# Patient Record
Sex: MALE | Race: WHITE | NOT HISPANIC OR LATINO | Employment: OTHER | URBAN - METROPOLITAN AREA
[De-identification: names, ages, dates, MRNs, and addresses within clinical notes are randomized per-mention and may not be internally consistent; named-entity substitution may affect disease eponyms.]

---

## 2022-01-01 ENCOUNTER — HOSPITAL ENCOUNTER (INPATIENT)
Facility: HOSPITAL | Age: 87
LOS: 2 days | Discharge: NURSING, REHAB OR LONG-TERM CARE | DRG: 177 | End: 2022-12-30
Attending: STUDENT IN AN ORGANIZED HEALTH CARE EDUCATION/TRAINING PROGRAM | Admitting: FAMILY MEDICINE
Payer: COMMERCIAL

## 2022-01-01 ENCOUNTER — APPOINTMENT (OUTPATIENT)
Dept: PHYSICAL THERAPY | Facility: HOSPITAL | Age: 87
DRG: 177 | End: 2022-01-01
Attending: STUDENT IN AN ORGANIZED HEALTH CARE EDUCATION/TRAINING PROGRAM
Payer: COMMERCIAL

## 2022-01-01 ENCOUNTER — MEDICAL CORRESPONDENCE (OUTPATIENT)
Dept: HEALTH INFORMATION MANAGEMENT | Facility: CLINIC | Age: 87
End: 2022-01-01

## 2022-01-01 ENCOUNTER — APPOINTMENT (OUTPATIENT)
Dept: CT IMAGING | Facility: HOSPITAL | Age: 87
DRG: 177 | End: 2022-01-01
Attending: STUDENT IN AN ORGANIZED HEALTH CARE EDUCATION/TRAINING PROGRAM
Payer: COMMERCIAL

## 2022-01-01 VITALS
RESPIRATION RATE: 18 BRPM | OXYGEN SATURATION: 93 % | TEMPERATURE: 99.9 F | WEIGHT: 149.7 LBS | HEIGHT: 64 IN | HEART RATE: 78 BPM | SYSTOLIC BLOOD PRESSURE: 110 MMHG | BODY MASS INDEX: 25.56 KG/M2 | DIASTOLIC BLOOD PRESSURE: 60 MMHG

## 2022-01-01 DIAGNOSIS — J18.9 COMMUNITY ACQUIRED PNEUMONIA, UNSPECIFIED LATERALITY: Primary | ICD-10-CM

## 2022-01-01 DIAGNOSIS — R09.02 HYPOXIA: ICD-10-CM

## 2022-01-01 DIAGNOSIS — U07.1 COVID-19: ICD-10-CM

## 2022-01-01 LAB
ALBUMIN SERPL BCG-MCNC: 3.1 G/DL (ref 3.5–5.2)
ALBUMIN SERPL BCG-MCNC: 3.5 G/DL (ref 3.5–5.2)
ALP SERPL-CCNC: 62 U/L (ref 40–129)
ALP SERPL-CCNC: 66 U/L (ref 40–129)
ALT SERPL W P-5'-P-CCNC: 28 U/L (ref 10–50)
ALT SERPL W P-5'-P-CCNC: 30 U/L (ref 10–50)
ANION GAP SERPL CALCULATED.3IONS-SCNC: 11 MMOL/L (ref 7–15)
ANION GAP SERPL CALCULATED.3IONS-SCNC: 11 MMOL/L (ref 7–15)
ANION GAP SERPL CALCULATED.3IONS-SCNC: 13 MMOL/L (ref 7–15)
AST SERPL W P-5'-P-CCNC: 37 U/L (ref 10–50)
AST SERPL W P-5'-P-CCNC: 39 U/L (ref 10–50)
BASOPHILS # BLD AUTO: 0 10E3/UL (ref 0–0.2)
BASOPHILS # BLD AUTO: 0 10E3/UL (ref 0–0.2)
BASOPHILS NFR BLD AUTO: 0 %
BASOPHILS NFR BLD AUTO: 0 %
BILIRUB SERPL-MCNC: 0.8 MG/DL
BILIRUB SERPL-MCNC: 0.8 MG/DL
BUN SERPL-MCNC: 30.5 MG/DL (ref 8–23)
BUN SERPL-MCNC: 31.1 MG/DL (ref 8–23)
BUN SERPL-MCNC: 32.7 MG/DL (ref 8–23)
CALCIUM SERPL-MCNC: 8.6 MG/DL (ref 8.8–10.2)
CALCIUM SERPL-MCNC: 9 MG/DL (ref 8.8–10.2)
CALCIUM SERPL-MCNC: 9.1 MG/DL (ref 8.8–10.2)
CHLORIDE SERPL-SCNC: 104 MMOL/L (ref 98–107)
CHLORIDE SERPL-SCNC: 107 MMOL/L (ref 98–107)
CHLORIDE SERPL-SCNC: 108 MMOL/L (ref 98–107)
CREAT SERPL-MCNC: 0.93 MG/DL (ref 0.67–1.17)
CREAT SERPL-MCNC: 1.01 MG/DL (ref 0.67–1.17)
CREAT SERPL-MCNC: 1.09 MG/DL (ref 0.67–1.17)
CRP SERPL-MCNC: 105.1 MG/L
D DIMER PPP FEU-MCNC: 1.87 UG/ML FEU (ref 0–0.5)
DEPRECATED HCO3 PLAS-SCNC: 23 MMOL/L (ref 22–29)
DEPRECATED HCO3 PLAS-SCNC: 28 MMOL/L (ref 22–29)
DEPRECATED HCO3 PLAS-SCNC: 29 MMOL/L (ref 22–29)
EOSINOPHIL # BLD AUTO: 0 10E3/UL (ref 0–0.7)
EOSINOPHIL # BLD AUTO: 0 10E3/UL (ref 0–0.7)
EOSINOPHIL NFR BLD AUTO: 0 %
EOSINOPHIL NFR BLD AUTO: 0 %
ERYTHROCYTE [DISTWIDTH] IN BLOOD BY AUTOMATED COUNT: 12.8 % (ref 10–15)
ERYTHROCYTE [DISTWIDTH] IN BLOOD BY AUTOMATED COUNT: 13 % (ref 10–15)
ERYTHROCYTE [DISTWIDTH] IN BLOOD BY AUTOMATED COUNT: 13 % (ref 10–15)
FERRITIN SERPL-MCNC: 730 NG/ML (ref 31–409)
FLUAV RNA SPEC QL NAA+PROBE: NEGATIVE
FLUBV RNA RESP QL NAA+PROBE: NEGATIVE
GFR SERPL CREATININE-BSD FRML MDRD: 66 ML/MIN/1.73M2
GFR SERPL CREATININE-BSD FRML MDRD: 72 ML/MIN/1.73M2
GFR SERPL CREATININE-BSD FRML MDRD: 80 ML/MIN/1.73M2
GLUCOSE SERPL-MCNC: 112 MG/DL (ref 70–99)
GLUCOSE SERPL-MCNC: 122 MG/DL (ref 70–99)
GLUCOSE SERPL-MCNC: 125 MG/DL (ref 70–99)
HCT VFR BLD AUTO: 47.3 % (ref 40–53)
HCT VFR BLD AUTO: 48.1 % (ref 40–53)
HCT VFR BLD AUTO: 50.6 % (ref 40–53)
HGB BLD-MCNC: 15.1 G/DL (ref 13.3–17.7)
HGB BLD-MCNC: 15.1 G/DL (ref 13.3–17.7)
HGB BLD-MCNC: 15.8 G/DL (ref 13.3–17.7)
HOLD SPECIMEN: NORMAL
HOLD SPECIMEN: NORMAL
IMM GRANULOCYTES # BLD: 0 10E3/UL
IMM GRANULOCYTES # BLD: 0.1 10E3/UL
IMM GRANULOCYTES NFR BLD: 1 %
IMM GRANULOCYTES NFR BLD: 1 %
LYMPHOCYTES # BLD AUTO: 0.7 10E3/UL (ref 0.8–5.3)
LYMPHOCYTES # BLD AUTO: 0.7 10E3/UL (ref 0.8–5.3)
LYMPHOCYTES NFR BLD AUTO: 11 %
LYMPHOCYTES NFR BLD AUTO: 12 %
MAGNESIUM SERPL-MCNC: 2.4 MG/DL (ref 1.7–2.3)
MCH RBC QN AUTO: 29.8 PG (ref 26.5–33)
MCH RBC QN AUTO: 30 PG (ref 26.5–33)
MCH RBC QN AUTO: 30.2 PG (ref 26.5–33)
MCHC RBC AUTO-ENTMCNC: 31.2 G/DL (ref 31.5–36.5)
MCHC RBC AUTO-ENTMCNC: 31.4 G/DL (ref 31.5–36.5)
MCHC RBC AUTO-ENTMCNC: 31.9 G/DL (ref 31.5–36.5)
MCV RBC AUTO: 94 FL (ref 78–100)
MCV RBC AUTO: 96 FL (ref 78–100)
MCV RBC AUTO: 97 FL (ref 78–100)
MONOCYTES # BLD AUTO: 0.4 10E3/UL (ref 0–1.3)
MONOCYTES # BLD AUTO: 0.5 10E3/UL (ref 0–1.3)
MONOCYTES NFR BLD AUTO: 7 %
MONOCYTES NFR BLD AUTO: 8 %
NEUTROPHILS # BLD AUTO: 4.7 10E3/UL (ref 1.6–8.3)
NEUTROPHILS # BLD AUTO: 5 10E3/UL (ref 1.6–8.3)
NEUTROPHILS NFR BLD AUTO: 80 %
NEUTROPHILS NFR BLD AUTO: 80 %
NRBC # BLD AUTO: 0 10E3/UL
NRBC # BLD AUTO: 0 10E3/UL
NRBC BLD AUTO-RTO: 0 /100
NRBC BLD AUTO-RTO: 0 /100
NT-PROBNP SERPL-MCNC: 257 PG/ML (ref 0–1800)
PHOSPHATE SERPL-MCNC: 2.9 MG/DL (ref 2.5–4.5)
PLATELET # BLD AUTO: 161 10E3/UL (ref 150–450)
PLATELET # BLD AUTO: 163 10E3/UL (ref 150–450)
PLATELET # BLD AUTO: 168 10E3/UL (ref 150–450)
POTASSIUM SERPL-SCNC: 3.6 MMOL/L (ref 3.4–5.3)
POTASSIUM SERPL-SCNC: 3.6 MMOL/L (ref 3.4–5.3)
POTASSIUM SERPL-SCNC: 3.9 MMOL/L (ref 3.4–5.3)
PROCALCITONIN SERPL IA-MCNC: 1.29 NG/ML
PROT SERPL-MCNC: 6.2 G/DL (ref 6.4–8.3)
PROT SERPL-MCNC: 6.5 G/DL (ref 6.4–8.3)
RBC # BLD AUTO: 5.03 10E6/UL (ref 4.4–5.9)
RBC # BLD AUTO: 5.06 10E6/UL (ref 4.4–5.9)
RBC # BLD AUTO: 5.23 10E6/UL (ref 4.4–5.9)
RSV RNA SPEC NAA+PROBE: NEGATIVE
SARS-COV-2 RNA RESP QL NAA+PROBE: POSITIVE
SODIUM SERPL-SCNC: 143 MMOL/L (ref 136–145)
SODIUM SERPL-SCNC: 144 MMOL/L (ref 136–145)
SODIUM SERPL-SCNC: 147 MMOL/L (ref 136–145)
TROPONIN T SERPL HS-MCNC: 17 NG/L
WBC # BLD AUTO: 5.8 10E3/UL (ref 4–11)
WBC # BLD AUTO: 6.1 10E3/UL (ref 4–11)
WBC # BLD AUTO: 6.2 10E3/UL (ref 4–11)

## 2022-01-01 PROCEDURE — 250N000013 HC RX MED GY IP 250 OP 250 PS 637: Performed by: STUDENT IN AN ORGANIZED HEALTH CARE EDUCATION/TRAINING PROGRAM

## 2022-01-01 PROCEDURE — 97116 GAIT TRAINING THERAPY: CPT | Mod: GP

## 2022-01-01 PROCEDURE — 250N000011 HC RX IP 250 OP 636: Performed by: STUDENT IN AN ORGANIZED HEALTH CARE EDUCATION/TRAINING PROGRAM

## 2022-01-01 PROCEDURE — 99222 1ST HOSP IP/OBS MODERATE 55: CPT | Mod: AI

## 2022-01-01 PROCEDURE — 250N000011 HC RX IP 250 OP 636

## 2022-01-01 PROCEDURE — G1010 CDSM STANSON: HCPCS

## 2022-01-01 PROCEDURE — 82728 ASSAY OF FERRITIN: CPT

## 2022-01-01 PROCEDURE — 93005 ELECTROCARDIOGRAM TRACING: CPT | Performed by: STUDENT IN AN ORGANIZED HEALTH CARE EDUCATION/TRAINING PROGRAM

## 2022-01-01 PROCEDURE — 87040 BLOOD CULTURE FOR BACTERIA: CPT | Performed by: STUDENT IN AN ORGANIZED HEALTH CARE EDUCATION/TRAINING PROGRAM

## 2022-01-01 PROCEDURE — 85379 FIBRIN DEGRADATION QUANT: CPT | Performed by: STUDENT IN AN ORGANIZED HEALTH CARE EDUCATION/TRAINING PROGRAM

## 2022-01-01 PROCEDURE — 258N000003 HC RX IP 258 OP 636: Performed by: STUDENT IN AN ORGANIZED HEALTH CARE EDUCATION/TRAINING PROGRAM

## 2022-01-01 PROCEDURE — 36415 COLL VENOUS BLD VENIPUNCTURE: CPT | Performed by: STUDENT IN AN ORGANIZED HEALTH CARE EDUCATION/TRAINING PROGRAM

## 2022-01-01 PROCEDURE — 99285 EMERGENCY DEPT VISIT HI MDM: CPT | Mod: 25

## 2022-01-01 PROCEDURE — 85025 COMPLETE CBC W/AUTO DIFF WBC: CPT | Performed by: STUDENT IN AN ORGANIZED HEALTH CARE EDUCATION/TRAINING PROGRAM

## 2022-01-01 PROCEDURE — 250N000013 HC RX MED GY IP 250 OP 250 PS 637

## 2022-01-01 PROCEDURE — C9803 HOPD COVID-19 SPEC COLLECT: HCPCS

## 2022-01-01 PROCEDURE — 80048 BASIC METABOLIC PNL TOTAL CA: CPT | Performed by: STUDENT IN AN ORGANIZED HEALTH CARE EDUCATION/TRAINING PROGRAM

## 2022-01-01 PROCEDURE — 86140 C-REACTIVE PROTEIN: CPT

## 2022-01-01 PROCEDURE — 84484 ASSAY OF TROPONIN QUANT: CPT | Performed by: STUDENT IN AN ORGANIZED HEALTH CARE EDUCATION/TRAINING PROGRAM

## 2022-01-01 PROCEDURE — 80053 COMPREHEN METABOLIC PANEL: CPT | Performed by: STUDENT IN AN ORGANIZED HEALTH CARE EDUCATION/TRAINING PROGRAM

## 2022-01-01 PROCEDURE — 120N000001 HC R&B MED SURG/OB

## 2022-01-01 PROCEDURE — 84100 ASSAY OF PHOSPHORUS: CPT | Performed by: STUDENT IN AN ORGANIZED HEALTH CARE EDUCATION/TRAINING PROGRAM

## 2022-01-01 PROCEDURE — 84145 PROCALCITONIN (PCT): CPT | Performed by: STUDENT IN AN ORGANIZED HEALTH CARE EDUCATION/TRAINING PROGRAM

## 2022-01-01 PROCEDURE — 99238 HOSP IP/OBS DSCHRG MGMT 30/<: CPT | Mod: GC | Performed by: STUDENT IN AN ORGANIZED HEALTH CARE EDUCATION/TRAINING PROGRAM

## 2022-01-01 PROCEDURE — 36415 COLL VENOUS BLD VENIPUNCTURE: CPT

## 2022-01-01 PROCEDURE — 97162 PT EVAL MOD COMPLEX 30 MIN: CPT | Mod: GP

## 2022-01-01 PROCEDURE — 85025 COMPLETE CBC W/AUTO DIFF WBC: CPT

## 2022-01-01 PROCEDURE — 87637 SARSCOV2&INF A&B&RSV AMP PRB: CPT | Performed by: STUDENT IN AN ORGANIZED HEALTH CARE EDUCATION/TRAINING PROGRAM

## 2022-01-01 PROCEDURE — 85018 HEMOGLOBIN: CPT | Performed by: STUDENT IN AN ORGANIZED HEALTH CARE EDUCATION/TRAINING PROGRAM

## 2022-01-01 PROCEDURE — 80053 COMPREHEN METABOLIC PANEL: CPT

## 2022-01-01 PROCEDURE — 83735 ASSAY OF MAGNESIUM: CPT | Performed by: STUDENT IN AN ORGANIZED HEALTH CARE EDUCATION/TRAINING PROGRAM

## 2022-01-01 PROCEDURE — 83880 ASSAY OF NATRIURETIC PEPTIDE: CPT | Performed by: STUDENT IN AN ORGANIZED HEALTH CARE EDUCATION/TRAINING PROGRAM

## 2022-01-01 RX ORDER — ACETAMINOPHEN 325 MG/1
650 TABLET ORAL EVERY 6 HOURS PRN
Status: DISCONTINUED | OUTPATIENT
Start: 2022-01-01 | End: 2022-01-01 | Stop reason: HOSPADM

## 2022-01-01 RX ORDER — ATORVASTATIN CALCIUM 40 MG/1
40 TABLET, FILM COATED ORAL AT BEDTIME
COMMUNITY
Start: 2022-01-01

## 2022-01-01 RX ORDER — CEFTRIAXONE 1 G/1
1 INJECTION, POWDER, FOR SOLUTION INTRAMUSCULAR; INTRAVENOUS EVERY 24 HOURS
Status: DISCONTINUED | OUTPATIENT
Start: 2022-01-01 | End: 2022-01-01 | Stop reason: HOSPADM

## 2022-01-01 RX ORDER — ASPIRIN 81 MG/1
81 TABLET ORAL DAILY
Status: DISCONTINUED | OUTPATIENT
Start: 2022-01-01 | End: 2022-01-01 | Stop reason: HOSPADM

## 2022-01-01 RX ORDER — AZITHROMYCIN 250 MG/1
250 TABLET, FILM COATED ORAL DAILY
Status: DISCONTINUED | OUTPATIENT
Start: 2022-01-01 | End: 2022-01-01 | Stop reason: HOSPADM

## 2022-01-01 RX ORDER — ALLOPURINOL 300 MG/1
300 TABLET ORAL DAILY
Status: DISCONTINUED | OUTPATIENT
Start: 2022-01-01 | End: 2022-01-01 | Stop reason: HOSPADM

## 2022-01-01 RX ORDER — CLOPIDOGREL BISULFATE 75 MG/1
75 TABLET ORAL DAILY
COMMUNITY
Start: 2022-01-01

## 2022-01-01 RX ORDER — ENOXAPARIN SODIUM 100 MG/ML
40 INJECTION SUBCUTANEOUS EVERY 24 HOURS
Status: DISCONTINUED | OUTPATIENT
Start: 2022-01-01 | End: 2022-01-01 | Stop reason: HOSPADM

## 2022-01-01 RX ORDER — IOPAMIDOL 755 MG/ML
100 INJECTION, SOLUTION INTRAVASCULAR ONCE
Status: COMPLETED | OUTPATIENT
Start: 2022-01-01 | End: 2022-01-01

## 2022-01-01 RX ORDER — LISINOPRIL 5 MG/1
5 TABLET ORAL DAILY
Status: DISCONTINUED | OUTPATIENT
Start: 2022-01-01 | End: 2022-01-01 | Stop reason: HOSPADM

## 2022-01-01 RX ORDER — MULTIPLE VITAMINS W/ MINERALS TAB 9MG-400MCG
1 TAB ORAL DAILY
COMMUNITY

## 2022-01-01 RX ORDER — ATORVASTATIN CALCIUM 40 MG/1
40 TABLET, FILM COATED ORAL AT BEDTIME
Status: DISCONTINUED | OUTPATIENT
Start: 2022-01-01 | End: 2022-01-01 | Stop reason: HOSPADM

## 2022-01-01 RX ORDER — POLYETHYLENE GLYCOL 3350 17 G/17G
17 POWDER, FOR SOLUTION ORAL DAILY
Status: DISCONTINUED | OUTPATIENT
Start: 2022-01-01 | End: 2022-01-01 | Stop reason: HOSPADM

## 2022-01-01 RX ORDER — PROCHLORPERAZINE MALEATE 5 MG
5 TABLET ORAL EVERY 6 HOURS PRN
Status: DISCONTINUED | OUTPATIENT
Start: 2022-01-01 | End: 2022-01-01 | Stop reason: HOSPADM

## 2022-01-01 RX ORDER — AZITHROMYCIN 250 MG/1
250 TABLET, FILM COATED ORAL DAILY
Qty: 4 TABLET | Refills: 0 | Status: SHIPPED | OUTPATIENT
Start: 2022-01-01

## 2022-01-01 RX ORDER — LISINOPRIL 5 MG/1
5 TABLET ORAL DAILY
Status: DISCONTINUED | OUTPATIENT
Start: 2022-01-01 | End: 2022-01-01

## 2022-01-01 RX ORDER — LIDOCAINE 40 MG/G
CREAM TOPICAL
Status: DISCONTINUED | OUTPATIENT
Start: 2022-01-01 | End: 2022-01-01 | Stop reason: HOSPADM

## 2022-01-01 RX ORDER — MULTIVIT-MIN/IRON/FOLIC ACID/K 18-600-40
1000 CAPSULE ORAL DAILY
COMMUNITY

## 2022-01-01 RX ORDER — HYDROXYZINE HYDROCHLORIDE 25 MG/1
50 TABLET, FILM COATED ORAL EVERY 6 HOURS PRN
Status: DISCONTINUED | OUTPATIENT
Start: 2022-01-01 | End: 2022-01-01

## 2022-01-01 RX ORDER — PROCHLORPERAZINE 25 MG
12.5 SUPPOSITORY, RECTAL RECTAL EVERY 12 HOURS PRN
Status: DISCONTINUED | OUTPATIENT
Start: 2022-01-01 | End: 2022-01-01 | Stop reason: HOSPADM

## 2022-01-01 RX ORDER — ASPIRIN 81 MG/1
81 TABLET ORAL DAILY
COMMUNITY

## 2022-01-01 RX ORDER — HYDROXYZINE HYDROCHLORIDE 25 MG/1
25 TABLET, FILM COATED ORAL EVERY 6 HOURS PRN
Status: DISCONTINUED | OUTPATIENT
Start: 2022-01-01 | End: 2022-01-01

## 2022-01-01 RX ORDER — LISINOPRIL 5 MG/1
5 TABLET ORAL DAILY
COMMUNITY
Start: 2022-01-01

## 2022-01-01 RX ORDER — AZITHROMYCIN 250 MG/1
500 TABLET, FILM COATED ORAL ONCE
Status: COMPLETED | OUTPATIENT
Start: 2022-01-01 | End: 2022-01-01

## 2022-01-01 RX ORDER — OLANZAPINE 5 MG/1
5 TABLET, ORALLY DISINTEGRATING ORAL
Status: DISCONTINUED | OUTPATIENT
Start: 2022-01-01 | End: 2022-01-01 | Stop reason: HOSPADM

## 2022-01-01 RX ORDER — ENOXAPARIN SODIUM 100 MG/ML
40 INJECTION SUBCUTANEOUS EVERY 24 HOURS
Status: DISCONTINUED | OUTPATIENT
Start: 2022-01-01 | End: 2022-01-01

## 2022-01-01 RX ORDER — ALLOPURINOL 300 MG/1
300 TABLET ORAL DAILY
COMMUNITY
Start: 2022-01-01

## 2022-01-01 RX ADMIN — SODIUM CHLORIDE, POTASSIUM CHLORIDE, SODIUM LACTATE AND CALCIUM CHLORIDE 250 ML: 600; 310; 30; 20 INJECTION, SOLUTION INTRAVENOUS at 17:03

## 2022-01-01 RX ADMIN — HYDROXYZINE HYDROCHLORIDE 25 MG: 25 TABLET, FILM COATED ORAL at 23:46

## 2022-01-01 RX ADMIN — LISINOPRIL 5 MG: 5 TABLET ORAL at 08:11

## 2022-01-01 RX ADMIN — CEFTRIAXONE SODIUM 1 G: 1 INJECTION, POWDER, FOR SOLUTION INTRAMUSCULAR; INTRAVENOUS at 13:52

## 2022-01-01 RX ADMIN — AZITHROMYCIN MONOHYDRATE 500 MG: 250 TABLET ORAL at 13:54

## 2022-01-01 RX ADMIN — HYDROXYZINE HYDROCHLORIDE 50 MG: 25 TABLET, FILM COATED ORAL at 09:25

## 2022-01-01 RX ADMIN — IOPAMIDOL 100 ML: 755 INJECTION, SOLUTION INTRAVENOUS at 16:50

## 2022-01-01 RX ADMIN — HYDROXYZINE HYDROCHLORIDE 25 MG: 25 TABLET, FILM COATED ORAL at 19:56

## 2022-01-01 RX ADMIN — HYDROXYZINE HYDROCHLORIDE 25 MG: 25 TABLET, FILM COATED ORAL at 02:42

## 2022-01-01 RX ADMIN — ATORVASTATIN CALCIUM 40 MG: 40 TABLET, FILM COATED ORAL at 21:53

## 2022-01-01 RX ADMIN — LISINOPRIL 5 MG: 5 TABLET ORAL at 09:27

## 2022-01-01 RX ADMIN — ALLOPURINOL 300 MG: 300 TABLET ORAL at 08:11

## 2022-01-01 RX ADMIN — Medication 1 MG: at 02:41

## 2022-01-01 RX ADMIN — ACETAMINOPHEN 650 MG: 325 TABLET ORAL at 08:11

## 2022-01-01 RX ADMIN — ENOXAPARIN SODIUM 40 MG: 40 INJECTION SUBCUTANEOUS at 02:41

## 2022-01-01 RX ADMIN — ACETAMINOPHEN 650 MG: 325 TABLET ORAL at 05:34

## 2022-01-01 RX ADMIN — AZITHROMYCIN MONOHYDRATE 250 MG: 250 TABLET ORAL at 09:26

## 2022-01-01 RX ADMIN — ACETAMINOPHEN 650 MG: 325 TABLET ORAL at 15:50

## 2022-01-01 RX ADMIN — LISINOPRIL 5 MG: 5 TABLET ORAL at 02:42

## 2022-01-01 RX ADMIN — ALLOPURINOL 300 MG: 300 TABLET ORAL at 09:26

## 2022-01-01 RX ADMIN — POLYETHYLENE GLYCOL 3350 17 G: 17 POWDER, FOR SOLUTION ORAL at 13:55

## 2022-01-01 RX ADMIN — SODIUM CHLORIDE 500 ML: 9 INJECTION, SOLUTION INTRAVENOUS at 13:54

## 2022-01-01 RX ADMIN — Medication 81 MG: at 09:25

## 2022-01-01 RX ADMIN — ATORVASTATIN CALCIUM 40 MG: 40 TABLET, FILM COATED ORAL at 02:51

## 2022-01-01 RX ADMIN — ACETAMINOPHEN 650 MG: 325 TABLET ORAL at 02:42

## 2022-01-01 RX ADMIN — ENOXAPARIN SODIUM 40 MG: 40 INJECTION SUBCUTANEOUS at 09:28

## 2022-01-01 ASSESSMENT — ACTIVITIES OF DAILY LIVING (ADL)
ADLS_ACUITY_SCORE: 35
ADLS_ACUITY_SCORE: 35
ADLS_ACUITY_SCORE: 37
ADLS_ACUITY_SCORE: 37
ADLS_ACUITY_SCORE: 39
ADLS_ACUITY_SCORE: 37
ADLS_ACUITY_SCORE: 35
ADLS_ACUITY_SCORE: 37
ADLS_ACUITY_SCORE: 35
ADLS_ACUITY_SCORE: 37
ADLS_ACUITY_SCORE: 37
ADLS_ACUITY_SCORE: 39
ADLS_ACUITY_SCORE: 35
ADLS_ACUITY_SCORE: 37
DEPENDENT_IADLS:: CLEANING;COOKING;LAUNDRY;SHOPPING;MEAL PREPARATION;MEDICATION MANAGEMENT;TRANSPORTATION
ADLS_ACUITY_SCORE: 39
ADLS_ACUITY_SCORE: 35
ADLS_ACUITY_SCORE: 39
ADLS_ACUITY_SCORE: 39

## 2022-12-28 PROBLEM — U07.1 COVID: Status: ACTIVE | Noted: 2022-01-01

## 2022-12-28 NOTE — ED NOTES
Bed: [place] [address]  Expected date:   Expected time:   Means of arrival: Ambulance  Comments:  WBL: Lethargic, hypoxic

## 2022-12-28 NOTE — ED TRIAGE NOTES
Presents via [facility] from [facility] for c/o AMS.  Hx of dementia.  Tested +COVID on 12/24/22.  Dtr visiting today, thought he was tired/sleepy, let him sleep.  Staff rounded on him, noted he was hard to arouse despite painful stimuli.  Initial sats 84%, FIRE started pt on 4L, responded well to mid 90s.  .  Has an 18g to left arm.           Triage Assessment     Row Name 12/28/22 4985       Triage Assessment (Adult)    Airway WDL WDL       Respiratory WDL    Respiratory WDL X       Skin Circulation/Temperature WDL    Skin Circulation/Temperature WDL WDL       Cognitive/Neuro/Behavioral WDL    Cognitive/Neuro/Behavioral WDL X;orientation    Orientation disoriented to;place;time;situation       [place] Coma Scale    Best Eye Response 4-->(E4) spontaneous    Best Motor Response 6-->(M6) obeys commands    Best Verbal Response 4-->(V4) confused    Rafy Coma Scale Score 14

## 2022-12-28 NOTE — ED PROVIDER NOTES
NAME: Richard Jordan  AGE: 87 year old male  YOB: 1935  MRN: [id number]  EVALUATION DATE & TIME: 2022  2:16 PM    PCP: Verner, Ruth  ED PROVIDER: Lisa Dye MD.    Chief Complaint   Patient presents with     Altered Mental Status     Covid Concern       FINAL IMPRESSION:  1. COVID-19    2. Hypoxia        MEDICAL DECISION MAKIN:31 PM I met with the patient, obtained history, performed an initial exam, and discussed options and plan for diagnostics and treatment here in the ED.   2:59 PM I spoke with patient's daughter, Yocasta.   4:40 PM I spoke with Dr. Sparks, hospitalist, who agrees to plan of admission.     MDM: 88 y/o M with h/o dementia who presents with altered mental status. Recently found to be covid19 positive and was difficult to wake today, found to be hypoxic to 84% by medics. Here is sating well on 3 L, will wake to voice and answer some questions. Suspect symptoms are related to covid19 infection. EKG is sinus without MINDY, troponin 17, denies any chest pain, lower suspicion for ACS. D-dimer is elevated. CT chest PE scan shows no PE, slight nodular moderate patchy bilateral pulmonary infiltrates, most likely pneumonia, not typical for covid. Discussed with resident who accepted patient for admission and will defer any antibiotics or steroid treatment to them. Confirmed with patient's daughter that he is DNR/DNI.    MEDICATIONS GIVEN IN THE EMERGENCY:  Medications   lactated ringers BOLUS 250 mL (250 mLs Intravenous New Bag 22 1703)   iopamidol (ISOVUE-370) solution 100 mL (100 mLs Intravenous Given 22 1650)       NEW PRESCRIPTIONS STARTED AT TODAY'S ER VISIT:  New Prescriptions    No medications on file        =================================================================  HPI    Patient information was obtained from: Patient and patient's daughter   Use of : N/A       Richard Jordan is a 87 year old male with a past medical history of  hypertension, dementia, CAD, who presents with altered mental status.     HPI limited due to altered mental status     Patient denies any pain. Orientated to self.     Per patient's daughter, patient was found to be Covid-19 positive on 12/23 at his living facility despite being asymptomatic prior. On 12/25 he developed a productive cough, fatigue, and generalized weakness. As of 12/27, she felt patient was starting to improve as he was sitting up and eating. Today she received a call from the facility telling her the patient was being transported to ED as his oxygen saturation was 84%. Daughter confirmed patient is DNR.       REVIEW OF SYSTEMS   Review of Systems   Unable to perform ROS: Mental status change        PAST MEDICAL HISTORY:  History reviewed. No pertinent past medical history.    PAST SURGICAL HISTORY:  No past surgical history on file.    CURRENT MEDICATIONS:    No current facility-administered medications for this encounter.    Current Outpatient Medications:      allopurinol (ZYLOPRIM) 300 MG tablet, Take 300 mg by mouth daily, Disp: , Rfl:      aspirin 81 MG EC tablet, Take 81 mg by mouth daily, Disp: , Rfl:      atorvastatin (LIPITOR) 40 MG tablet, Take 40 mg by mouth At Bedtime, Disp: , Rfl:      clopidogrel (PLAVIX) 75 MG tablet, Take 75 mg by mouth daily, Disp: , Rfl:      lisinopril (ZESTRIL) 5 MG tablet, Take 5 mg by mouth daily, Disp: , Rfl:      multivitamin w/minerals (THERA-VIT-M) tablet, Take 1 tablet by mouth daily, Disp: , Rfl:      Vitamin D, Cholecalciferol, 25 MCG (1000 UT) TABS, Take 1,000 Units by mouth daily, Disp: , Rfl:     ALLERGIES:  No Known Allergies    FAMILY HISTORY:  No family history on file.    SOCIAL HISTORY:   Social History     Socioeconomic History     Marital status:      Spouse name: None     Number of children: None     Years of education: None     Highest education level: None       PHYSICAL EXAM:    Vitals: BP (!) 148/67   Pulse 52   Temp 99.7  F  (37.6  C) (Oral)   Resp 28   SpO2 100%    Constitutional: Well developed, elderly ill appearing male  HENT: Normocephalic, atraumatic, mucous membranes moist. Neck-gross ROM intact.   Eyes: Pupils mid-range, sclera white, no discharge  Respiratory: CTAB, no respiratory distress on 3 L NC, no wheezing  Cardiovascular: Normal heart rate, regular rhythm. No lower extremity edema  GI: Soft, not distended, not tender to palpation, no palpable masses  Musculoskeletal: Moving all 4 extremities intentionally and without pain. No obvious deformity.  Skin: Warm, dry, no rash.  Neurologic: Hard of hearing, wakes to voice and answers some questions, confused, CNs grossly intact, strength/sensation intact in extremities    LAB:  All pertinent labs reviewed and interpreted.  Labs Ordered and Resulted from Time of ED Arrival to Time of ED Departure   COMPREHENSIVE METABOLIC PANEL - Abnormal       Result Value    Sodium 147 (*)     Potassium 3.6      Chloride 107      Carbon Dioxide (CO2) 29      Anion Gap 11      Urea Nitrogen 30.5 (*)     Creatinine 1.09      Calcium 9.0      Glucose 125 (*)     Alkaline Phosphatase 66      AST 39      ALT 30      Protein Total 6.5      Albumin 3.5      Bilirubin Total 0.8      GFR Estimate 66     INFLUENZA A/B & SARS-COV2 PCR MULTIPLEX - Abnormal    Influenza A PCR Negative      Influenza B PCR Negative      RSV PCR Negative      SARS CoV2 PCR Positive (*)    D DIMER QUANTITATIVE - Abnormal    D-Dimer Quantitative 1.87 (*)    CBC WITH PLATELETS AND DIFFERENTIAL - Abnormal    WBC Count 5.8      RBC Count 5.06      Hemoglobin 15.1      Hematocrit 47.3      MCV 94      MCH 29.8      MCHC 31.9      RDW 13.0      Platelet Count 163      % Neutrophils 80      % Lymphocytes 12      % Monocytes 7      % Eosinophils 0      % Basophils 0      % Immature Granulocytes 1      NRBCs per 100 WBC 0      Absolute Neutrophils 4.7      Absolute Lymphocytes 0.7 (*)     Absolute Monocytes 0.4      Absolute  Eosinophils 0.0      Absolute Basophils 0.0      Absolute Immature Granulocytes 0.0      Absolute NRBCs 0.0     TROPONIN T, HIGH SENSITIVITY - Normal    Troponin T, High Sensitivity 17     NT PROBNP INPATIENT - Normal    N terminal Pro BNP Inpatient 257         RADIOLOGY:  CT Chest Pulmonary Embolism w Contrast   Final Result   IMPRESSION:   1.  Negative for pulmonary emboli.      2.  Slightly nodular moderate patchy bilateral pulmonary infiltrates most likely pneumonia. Nonspecific but Not typical of COVID pneumonia.          EKG:   Performed at: 15:33  Impression: Normal sinus rhythm. Septal infarct. Age undetermined   Rate: 63 bpm  Rhythm: Sinus  QRS Interval: 80 ms   QTc Interval: 415 ms   I have independently reviewed and interpreted the EKG(s) documented above.     PROCEDURES:   Procedures     I, Meghana Delgado, am serving as a scribe to document services personally performed by Dr. Lisa Dye based on my observation and the provider's statements to me. I, Lisa Dye MD attest that Meghana Delgado is acting in a scribe capacity, has observed my performance of the services and has documented them in accordance with my direction.    Lisa Dye M.D.  Emergency Medicine  [facility] EMERGENCY DEPARTMENT  [address] [place] [id number] [phone]  Dept: [phone]       Lisa Dye MD  12/28/22 1339

## 2022-12-28 NOTE — H&P
[facility]    History and Physical - Hospitalist Service       Date of Admission:  12/28/2022    Assessment & Plan      Richard Jordan is a 87 year old male admitted on 12/28/2022. He has a past medical history of hypertension, CAD, dementia, presented with generalized weakness, fatigue, altered mental status and oxygen requirement in the setting of COVID infection since 12/23.     Vaccination status: unvaccinated against covid.       Covid 19-infection  Hypoxia  AMS  Patient was found to be COVID-positive 12/23 after COVID outbreak in assisted living facility.  Noted to develop mild symptoms on the 12/24.  Daughter reports he started to get worse today, with poor appetite generalized weakness and sleepiness.  Was found unresponsive and hypoxic 80s this afternoon 12/27.  And was transported by EMS to the emergency department. Started on 3 LFNC and down to 1.5 L within 1-2 hours sating 92-95 with no increased wob. Labs uremarkable except for elevated . Slightly elevated D-dimer 1.8. CT PE negative, lungs : Patchy slightly nodular infiltrates in both lungs primarily in the right upper lobe posterior segment as well as both lower lobes posteriorly. Not classic for COVID pneumonia. Could be bacterial pneumonia but given patient being afebrile, normal wbc, will continue to monitor and get procal if new fever or woresening O2 requirement. Will continue to monitor vitals closely. Daughter is with him and declines covid treatment ; remdesivir or dexa and wants to discuss this with sister who is health agent for patient in the morning.      - continuous pulse ox  - continue current O2 support titrate to keep sats 90-96%  - monitor I/Os.  - Lovenox 40 mg q24h  - tylenol PRN for fever  - consider Bcx/sputum cx with new fever or worsening cxr. Given atypical pattern on CT chest. Consider procal and low threshold for abx.   - Daughter(Katrina) declines remdesivir and wants to consider and  "decide about dexamethasone and treatment in the AM waiting for daughter(health decision maker) to arrive traveling from another state.   - hold IVF overnight, lack of PMH in chart; if patient not taking fluids consider cautious hydration to avoid volume overload.  - cbc, cmp AM      Mild hypernatremia  Sodium is elevated 147, given poor intake in last few days, likely secondary to mild dehydration. Received fluid bolus and will monitor sodium.   - monitor Bmp.      Chronic conditions:   HTN: continue PTA lisinopril  Gout: Continue PTA allopurinol  Hyperlipidemia CAD; continue PTA atorvastatin; will hold aspirin overnight.          Diet: Combination Diet Regular Diet Adult Regular diet  DVT Prophylaxis: Enoxaparin (Lovenox) SQ  Marsh Catheter: Not present  Fluids: none  Central Lines: None  Cardiac Monitoring: None  Code Status: No CPR- Do NOT Intubate    Clinically Significant Risk Factors Present on Admission         # Hypernatremia: Highest Na = 147 mmol/L in last 2 days, will monitor as appropriate        # Drug Induced Platelet Defect: home medication list includes an antiplatelet medication   # Hypertension: home medication list includes antihypertensive(s)      # Overweight: Estimated body mass index is 25.7 kg/m  as calculated from the following:    Height as of this encounter: 1.626 m (5' 4\").    Weight as of this encounter: 67.9 kg (149 lb 11.2 oz).           Disposition Plan      Expected Discharge Date: 12/31/2022                The patient's care was discussed with the Attending Physician, Dr. Ahumada.    Saud Villegas MD  Hospitalist Service  [facility]  Securely message with the [employer] Web Console (learn more here)  Text page via [employer] Paging/Directory       ______________________________________________________________________    Chief Complaint    Fatigue/sleepiness  Cough  covid +ve    Unable to obtain a history from the patient due to age and confusion, history obtained " from daughter Cady at bedside.    History of Present Illness   Richard Jordan is a 87 year old male who admitted on 12/28.  He has been a past medical history of hypertension, CAD, dementia.     He lives in assisted living was tested 12/23 and found COVID-positive. intially asymptomatic, but on evening 24th daughter was visiting and noticed that he had a changed voice, was coughing, and seemed fatigued. She was visiting daily and assiting with eating and medications, had poor appetite no fever noted.   This morning daughter came he was sleeping and didn't wake up when she gently shaked him. She let him sleep and got a call that her dad was uresponsive in the afternoon. EMS arrived patient was AMS, hypoxic sats 80s and was put on oxygen and arrived here was on 3L sating mid 90. No fever.     DNI/DNR per daughter and POLST document.       Review of Systems    Review of systems not obtained due to patient factors - age, confusion and mental status    Past Medical History    I have reviewed this patient's medical history and updated it with pertinent information if needed.   History reviewed. No pertinent past medical history.     Past Surgical History   I have reviewed this patient's surgical history and updated it with pertinent information if needed.  No past surgical history on file.     Social History   I have reviewed this patient's social history and updated it with pertinent information if needed. Richard Jordan      Family History     Unable to obtain due to: patient dementia and hard of hearning.     Prior to Admission Medications   Prior to Admission Medications   Prescriptions Last Dose Informant Patient Reported? Taking?   Vitamin D, Cholecalciferol, 25 MCG (1000 UT) TABS 12/27/2022  Yes Yes   Sig: Take 1,000 Units by mouth daily   allopurinol (ZYLOPRIM) 300 MG tablet 12/27/2022  Yes Yes   Sig: Take 300 mg by mouth daily   aspirin 81 MG EC tablet 12/27/2022  Yes Yes   Sig: Take 81 mg by mouth daily    atorvastatin (LIPITOR) 40 MG tablet 12/27/2022  Yes Yes   Sig: Take 40 mg by mouth At Bedtime   clopidogrel (PLAVIX) 75 MG tablet 12/27/2022  Yes Yes   Sig: Take 75 mg by mouth daily   lisinopril (ZESTRIL) 5 MG tablet 12/27/2022  Yes Yes   Sig: Take 5 mg by mouth daily   multivitamin w/minerals (THERA-VIT-M) tablet 12/27/2022  Yes Yes   Sig: Take 1 tablet by mouth daily      Facility-Administered Medications: None     Allergies   No Known Allergies    Physical Exam   Vital Signs: Temp: 99.7  F (37.6  C) Temp src: Oral BP: (!) 148/67 Pulse: 91   Resp: (!) 33 SpO2: 95 % O2 Device: Nasal cannula Oxygen Delivery: 3 LPM  Weight: 149 lbs 11.2 oz    Constitutional: awake, alert, no apparent distress, and appears stated age   HEENT: Atraumatic, normocephalic, extra ocular muscles intact, sclera clear, conjunctiva normal  Hematologic / Lymphatic: no cervical lymphadenopathy  Respiratory:sternotomy scar good air exchange, clear to auscultation bilaterally, no crackles or wheezing  Cardiovascular:regular rate and rhythm, normal S1 and S2, and no murmur noted  GI:  normal bowel sounds, soft, non-distended, non-tender, no masses palpated, no hepatosplenomegally  Skin: no bruising or bleeding and normal skin color, texture, turgor  Neurologic: Awake, alert, confused,hard of hearing difficulty understanding simple questions.       -Data   Data reviewed today: I reviewed all medications, new labs and imaging results over the last 24 hours. I personally reviewed the chest CT image(s) showing : Patchy slightly nodular infiltrates in both lungs primarily in the right upper lobe posterior segment as well as both lower lobes posteriorly. Not classic for COVID pneumonia.    Recent Labs   Lab 12/28/22  1517   WBC 5.8   HGB 15.1   MCV 94      *   POTASSIUM 3.6   CHLORIDE 107   CO2 29   BUN 30.5*   CR 1.09   ANIONGAP 11   ORA 9.0   *   ALBUMIN 3.5   PROTTOTAL 6.5   BILITOTAL 0.8   ALKPHOS 66   ALT 30   AST 39     Most  Recent 3 CBC's:Recent Labs   Lab Test 12/28/22  1517   WBC 5.8   HGB 15.1   MCV 94        Most Recent 3 BMP's:Recent Labs   Lab Test 12/28/22  1517   *   POTASSIUM 3.6   CHLORIDE 107   CO2 29   BUN 30.5*   CR 1.09   ANIONGAP 11   ORA 9.0   *     Most Recent 2 LFT's:Recent Labs   Lab Test 12/28/22  1517   AST 39   ALT 30   ALKPHOS 66   BILITOTAL 0.8     Most Recent 3 Creatinines:Recent Labs   Lab Test 12/28/22  1517   CR 1.09     Most Recent 3 Hemoglobins:Recent Labs   Lab Test 12/28/22  1517   HGB 15.1     5.8    \    15.1    /    163   N 80    L N/A    147 (H)    107    30.5 (H) /   ------------------------------------ 125 (H)   ALT 30   AST 39   AP 66   ALB 3.5   Ca 9.0  3.6    29    1.09 \    % RETIC N/A    LDH N/A  Troponin N/A    BNP N/A    CK N/A  INR N/A   PTT N/A    D-dimer 1.87 (H)    Fibrinogen N/A    Antithrombin N/A  Ferritin N/A  .10 (H)    IL-6 N/A  Recent Results (from the past 24 hour(s))   CT Chest Pulmonary Embolism w Contrast    Narrative    EXAM: CT CHEST PULMONARY EMBOLISM W CONTRAST  LOCATION: [facility]  DATE/TIME: 12/28/2022 4:50 PM    INDICATION: covid, hypoxia, elevated d dimer  COMPARISON: None.  TECHNIQUE: CT chest pulmonary angiogram during arterial phase injection of IV contrast. Multiplanar reformats and MIP reconstructions were performed. Dose reduction techniques were used.   CONTRAST: 100ml isovue 370    FINDINGS:  ANGIOGRAM CHEST: Pulmonary arteries are normal caliber and negative for pulmonary emboli. Thoracic aorta is negative for dissection. No CT evidence of right heart strain.    LUNGS AND PLEURA: Patchy slightly nodular infiltrates in both lungs primarily in the right upper lobe posterior segment as well as both lower lobes posteriorly. Not classic for COVID pneumonia    MEDIASTINUM/AXILLAE: Normal.    CORONARY ARTERY CALCIFICATION: Severe.    UPPER ABDOMEN: Normal.    MUSCULOSKELETAL: Normal.      Impression     IMPRESSION:  1.  Negative for pulmonary emboli.    2.  Slightly nodular moderate patchy bilateral pulmonary infiltrates most likely pneumonia. Nonspecific but Not typical of COVID pneumonia.

## 2022-12-28 NOTE — PHARMACY-ADMISSION MEDICATION HISTORY
Pharmacy Note - Admission Medication History     ______________________________________________________________________    Prior To Admission (PTA) med list completed and updated in EMR.       PTA Med List   Medication Sig Note Last Dose     allopurinol (ZYLOPRIM) 300 MG tablet Take 300 mg by mouth daily  12/27/2022     aspirin 81 MG EC tablet Take 81 mg by mouth daily  12/27/2022     atorvastatin (LIPITOR) 40 MG tablet Take 40 mg by mouth At Bedtime 12/28/2022: [facility]. However, family told me he takes 1/2 tablet at bedtime and [facility] did confirm prescription was recently changed from 80 mg x 1/2 tab, so dose remains the same, he just may be using up previous supply. 12/27/2022     clopidogrel (PLAVIX) 75 MG tablet Take 75 mg by mouth daily  12/27/2022     lisinopril (ZESTRIL) 5 MG tablet Take 5 mg by mouth daily  12/27/2022     multivitamin w/minerals (THERA-VIT-M) tablet Take 1 tablet by mouth daily  12/27/2022     Vitamin D, Cholecalciferol, 25 MCG (1000 UT) TABS Take 1,000 Units by mouth daily  12/27/2022       Information source(s): Family member and Patient's pharmacy  Method of interview communication: in-person with N95 mask    Summary of Changes to PTA Med List  New: all  -- no prior records in EMR    Patient was asked about OTC/herbal products specifically.  PTA med list reflects this.    Allergies were reviewed, assessed, and updated with the patient.      Patient does not use any multi-dose medications prior to admission.    The information provided in this note is only as accurate as the sources available at the time of the update(s).    Thank you for the opportunity to participate in the care of this patient.    Meghana Rodas [facility]  12/28/2022 5:27 PM

## 2022-12-29 NOTE — ED NOTES
Bed: JNED-28  Expected date: 12/28/22  Expected time:   Means of arrival:   Comments:  HOLD for Room 18

## 2022-12-29 NOTE — PROGRESS NOTES
[facility]    Progress Note - Hospitalist Service       Date of Admission:  12/28/2022    Assessment & Plan   Richard Jordan is a 87 year old male admitted on 12/28/2022. He has a past medical history of hypertension, CAD, dementia, presented with generalized weakness, fatigue, altered mental status and oxygen requirement in the setting of COVID infection since 12/23.      Vaccination status: unvaccinated against covid.     Remains hospitalized for mild hypoxia, dehydration, and bradycardia.     Acute hypoxic respiratory failure  Covid 19-infection  CAP  Patient was found to be COVID-positive 12/23 after COVID outbreak in assisted living facility.  Noted to develop mild symptoms on the 12/24.  Daughter reports he started to get worse on day of admission, with poor appetite generalized weakness and sleepiness.  Was found unresponsive and hypoxic 80s afternoon of 12/27 and was transported by EMS to the emergency department. Started on 3L NC and down to 1.5 L within 1-2 hours sating 92-95 with no increased wob. Labs uremarkable except for elevated . Slightly elevated D-dimer 1.8. CT PE negative, lungs : Patchy slightly nodular infiltrates in both lungs primarily in the right upper lobe posterior segment as well as both lower lobes posteriorly. Not classic for COVID pneumonia, per radiologist. With this, as well as procal elevation to 1.29, will add treatment for CAP. Will continue to monitor vitals closely. Daughter is with him and declines covid treatment ; remdesivir or dexamethasone.  - continuous pulse ox  - O2 by NC as needed to maintain sats > 92%  - monitor I/Os.  - Lovenox 40 mg q24h  - tylenol PRN for fever  - Start Ceftriaxone/azithromycin  - Daughter(Katrina) declines remdesivir and dexamethasone  - hold IVF overnight, lack of PMH in chart; if patient not taking fluids consider cautious hydration to avoid volume overload.  - cbc, cmp AM  - 500mL NS bolus given low  "UOP    Bradycardia  Bradycardic overnight to high 30s. Asymptomatic. Admission EKG showing NSR with normal intervals. No h/o AV block on chart review. No report of any history of syncopal events from daughter.  -Repeat EKG now  -Monitor, tele  -Consider atropine only if symptomatic with lightheadedness/feeling faint. Would discuss with cardiology first    Constipation  -Daily miralax     Chronic conditions:   HTN: continue PTA lisinopril  Gout: Continue PTA allopurinol  Hyperlipidemia, CAD; continue PTA atorvastatin; aspirin       Diet: Combination Diet Regular Diet Adult Regular diet  DVT Prophylaxis: Enoxaparin (Lovenox) SQ  Marsh Catheter: Not present  Fluids: none  Central Lines: None  Cardiac Monitoring: None  Code Status: No CPR- Do NOT Intubate       Disposition Plan     Expected Discharge Date: 12/31/2022                The patient's care was discussed with the Attending Physician, Dr. Vail.    Loyd Pemberton MD  Hospitalist Service  [facility]  Securely message with the Vocera Web Console (learn more here)  Text page via [employer] Paging/Directory         Clinically Significant Risk Factors Present on Admission         # Hypernatremia: Highest Na = 147 mmol/L in last 2 days, will monitor as appropriate      # Hypoalbuminemia: Lowest albumin = 3.1 g/dL at 12/29/2022  7:33 AM, will monitor as appropriate   # Drug Induced Platelet Defect: home medication list includes an antiplatelet medication   # Hypertension: home medication list includes antihypertensive(s)      # Overweight: Estimated body mass index is 25.7 kg/m  as calculated from the following:    Height as of this encounter: 1.626 m (5' 4\").    Weight as of this encounter: 67.9 kg (149 lb 11.2 oz).           ______________________________________________________________________    Interval History   Quiet but arouses to voice.  Quite hard of hearing.  Reports breathing is okay.  Denies abdominal pain, though daughter thinks " abdomen is a bit distended.  She is worried he might be constipated.  Had BM last night, but per her, was small and hard.  She is also wondering about his low urine output.    For his bradycardia, she states that he is typically ambulating independently in his assisted living facility.  They are on the verge of getting him into memory care over the next few months.  He has never had any syncopal episodes to her knowledge.    Data reviewed today: I reviewed all medications, new labs and imaging results over the last 24 hours. I personally reviewed 12/28 EKG showing NSR with normal intervals.    Physical Exam   Vital Signs: Temp: (!) 96.2  F (35.7  C) Temp src: Axillary BP: (!) 157/67 Pulse: (!) 40   Resp: 20 SpO2: 91 % O2 Device: Nasal cannula Oxygen Delivery: 1 LPM  Weight: 149 lbs 11.2 oz  Constitutional: sleeping but arouses to voice. NAD  Respiratory: Normal WOB on RA. LTCA in anterior lung fields  GI: mild distention but no ttp  Neurologic: [place]; fluent speech. Oriented to self only    Data   Recent Results (from the past 24 hour(s))   CT Chest Pulmonary Embolism w Contrast    Narrative    EXAM: CT CHEST PULMONARY EMBOLISM W CONTRAST  LOCATION: [facility]  DATE/TIME: 12/28/2022 4:50 PM    INDICATION: covid, hypoxia, elevated d dimer  COMPARISON: None.  TECHNIQUE: CT chest pulmonary angiogram during arterial phase injection of IV contrast. Multiplanar reformats and MIP reconstructions were performed. Dose reduction techniques were used.   CONTRAST: 100ml isovue 370    FINDINGS:  ANGIOGRAM CHEST: Pulmonary arteries are normal caliber and negative for pulmonary emboli. Thoracic aorta is negative for dissection. No CT evidence of right heart strain.    LUNGS AND PLEURA: Patchy slightly nodular infiltrates in both lungs primarily in the right upper lobe posterior segment as well as both lower lobes posteriorly. Not classic for COVID pneumonia    MEDIASTINUM/AXILLAE: Normal.    CORONARY ARTERY  CALCIFICATION: Severe.    UPPER ABDOMEN: Normal.    MUSCULOSKELETAL: Normal.      Impression    IMPRESSION:  1.  Negative for pulmonary emboli.    2.  Slightly nodular moderate patchy bilateral pulmonary infiltrates most likely pneumonia. Nonspecific but Not typical of COVID pneumonia.

## 2022-12-29 NOTE — CONSULTS
"Care Management Initial Consult    General Information  Assessment completed with: Batool Barragan daughter via phone  Type of CM/SW Visit: Initial Assessment    Primary Care Provider verified and updated as needed: Yes   Readmission within the last 30 days: no previous admission in last 30 days      Reason for Consult: discharge planning  Advance Care Planning: Advance Care Planning Reviewed: no concerns identified          Communication Assessment  Patient's communication style: spoken language (English or Bilingual)             Cognitive  Cognitive/Neuro/Behavioral: per daughter, \"has dementia at baseline\"       Living Environment:   People in home: facility resident     Current living Arrangements: assisted living  Name of Facility: [facility] (in [facility]).   Able to return to prior arrangements: other (see comments) (unknown at this time)       Family/Social Support:  Care provided by: self, other (see comments) (Assisted Living staff)  Provides care for: no one, unable/limited ability to care for self  Marital Status:   Children, Facility resident(s)/Staff          Description of Support System: Supportive, Involved    Support Assessment: Adequate family and caregiver support, Adequate social supports, Patient communicates needs well met    Current Resources:   Patient receiving home care services: No     Community Resources: Skilled Nursing Facility (\"going to start services with [facility] physician group probably this next week. Paperwork is already signed\".)  Equipment currently used at home: grab bar, toilet, grab bar, tub/shower, shower chair (\"bed lowered\")  Supplies currently used at home: Hearing Aid Batteries, Incontinence Supplies (hearing aids at home)    Employment/Financial:  Employment Status: retired     Employment/ Comments: [facility] notification # [id number].  Financial Concerns:     Referral to Financial Worker: No   " "    Lifestyle & Psychosocial Needs:  Social Determinants of Health     Tobacco Use: Not on file   Alcohol Use: Not on file   Financial Resource Strain: Not on file   Food Insecurity: Not on file   Transportation Needs: Not on file   Physical Activity: Not on file   Stress: Not on file   Social Connections: Not on file   Intimate Partner Violence: Not on file   Depression: Not on file   Housing Stability: Not on file       Functional Status:  Prior to admission patient needed assistance:   Dependent ADLs:: Ambulation-no assistive device (\"he is still independent with these tasks but we are starting to notice it is not going to be long before help is needed. For example noticing he says he showered, but didn't change clothing, etc\".)  Dependent IADLs:: Cleaning, Cooking, Laundry, Shopping, Meal Preparation, Medication Management, Transportation (\"meals and light housekeeping and laundry help from facility. Family sets up his medications\".)  Assesssment of Functional Status: Not at baseline with ADL Functioning, Not at baseline with mobility, Not at  functional baseline    Mental Health Status:          Chemical Dependency Status:                Values/Beliefs:  Spiritual, Cultural Beliefs, Mosque Practices, Values that affect care:                 Additional Information:  Richard was found to be COVID + on 12/24/22. He lives at [facility] (in [facility] He is \"going to start services with [facility] physician group probably this next week. Paperwork is already signed\".    He is getting a low level of services from the Assisted Living and my need to increase services. Currently they only help with \"light housekeeping, 2 loads of laundry a week, and 3 meals a day. He does all other ADLs on his own. Since his COVID diagnosis his daughter Katrina who is a teacher is off work and has been helping him every day. Family has always been managing his medication set up and " "now [facility] staff will take that over soon.\" Per daughter, \"I think we are heading in the direction of him needing more help with other ADLs from the facility. But we are starting with this medication help from [facility] because then at least someone looks in on him twice a day. One example of what we are noticing is, \"for example noticing he says he showered, but didn't change clothing, etc\".    He has a \"nickname at the facility called 5 mile. He still walks 2-3 miles a day. It has been hard on him not being able to be outside to walk now that it is cold and icy. He walks in the halls of the facility. He does not use a cane or walker.\"    He may need Home Care or TCU depending on hospital course.    Family to transport at discharge.    1. Katrina Reyes daughter: [phone] (she has been the main daughter caring for him this week since his COVID diagnosis so she knows the most, but call other daughters also if needed).  2. Batool Coreas daughter: [phone].    [facility] notification #: [id number].    Ely Mcdermott RN      "

## 2022-12-29 NOTE — ED NOTES
Notified Dr. Cope that Pt's heart rate dips to 39 pbm when sleeping. Per Dr. Cope, it is okay to let Pt sleep for now, will readdress in the AM.

## 2022-12-29 NOTE — PROGRESS NOTES
"Resident Loyd Pemberton called me and asked if I can verify with facility if patient can return today since ready for discharge. And if can return with COVID since facility already has a COVID outbreak and patient has been positive since 12/24/22.    I called and spoke to Batool sheldon. Katrina daughter is home sleeping. Batool states, \"I am fine with dad going home today. I think that is a good plan. I would need to get family to go  clothing for him and then I would drive him home.\" I made her aware of the plan to go home with an oral antibiotic.    I called [facility] [phone]. I spoke with Jaqui SALDANA she states, \"I have to call my director who is off today to see if we can accept Richard back today\". I asked her about the increase in services of medication management being started and she stated, \"with the COVID outbreak right now I don't know when we would be able to start that service for him\". She wants the chart faxed to her.    1238 Call received from: Selene RN \"We are very uncomfortable with him coming back today. And if returns today he will need family to stay with him at all times. No RN is available to resume care until tomorrow. And again if he doesn't come back until tomorrow he would have to be an early admission or we would not be able to accept him back until Monday onto our services. We don't have staff that can take admissions after noon anyday\".    1246 Called Batool sheldon to discuss discharge planning. She states, \"I was just talking to the MD and he has told me that we will stay another night in the hospital now.\"    1252 I spoke with Dr. Pemberton. Plan is to stay and monitor longer.  "

## 2022-12-30 NOTE — DISCHARGE INSTRUCTIONS
Stay with patient he will need assist to get up and needs to be supervised 24-7   Call with fever over 101 encourage food and liquids   Call with difficulty breathing

## 2022-12-30 NOTE — SIGNIFICANT EVENT
"Significant Event Note    Time of event: 5:26 AM December 30, 2022    Description of event:  House officer notified of fever 100.9. Pt 87 year old male admitted for COVID, CAP, AHRF. Also found to be bradycardic. Pt presently on ceftriaxone and azithromycin.     /50 (BP Location: Right arm)   Pulse 54   Temp (!) 100.9  F (38.3  C)   Resp 18   Ht 1.626 m (5' 4\")   Wt 67.9 kg (149 lb 11.2 oz)   SpO2 92%   BMI 25.70 kg/m      Plan:  Fever most likely due to COVID/PNA.  -Blood cultures, as has not been drawn yet.   -Continue present antibiotic regimen for now  -Tylenol for fevers    Discussed with: bedside nurse    Nikhil Thompson, DO    "

## 2022-12-30 NOTE — PROGRESS NOTES
Spoke with SHANA Morton at [facility]. For discharge planning - she reports that there will be no nurse at the facility over the weekend or on Monday to accept pt back.     Please keep Selene updated on discharge plans. [phone]

## 2022-12-30 NOTE — PLAN OF CARE
Problem: Plan of Care - These are the overarching goals to be used throughout the patient stay.    Goal: Optimal Comfort and Wellbeing  Outcome: Progressing     Problem: Risk for Delirium  Goal: Improved Sleep  Outcome: Not Progressing   Goal Outcome Evaluation:    Pt is retaining urine; bladder scan of 650ml; pt is confused, shows no classic signs of covid; pt did urinate to fill one brief this shift; paged house physician--awaiting return call as there are no orders for this pt re urinary retention; pts abdomen is rounded and distended: Dr Chau gave phone order to straight cath and give additional 25 mg atarax to help alleviate anxiety; straight cathed    ML

## 2022-12-30 NOTE — PLAN OF CARE
Physical Therapy Discharge Summary    Reason for therapy discharge:    Discharged to [facility]    Progress towards therapy goal(s). See goals on Care Plan in [facility] electronic health record for goal details.  Goals partially met.  Barriers to achieving goals:   discharge from facility.    Therapy recommendation(s):    Recommend continued PT at [facility]

## 2022-12-30 NOTE — DISCHARGE SUMMARY
[facility]  Discharge Summary - Medicine & Pediatrics       Date of Admission:  12/28/2022  Date of Discharge:  12/30/2022  Discharging Provider: Dr. Pemberton  Discharge Service: Hospitalist Service    Discharge Diagnoses   1. Community acquired pneumonia, unspecified laterality    2. COVID-19    3. Hypoxia        Follow-ups Needed After Discharge   Follow-up Appointments     Follow Up and recommended labs and tests      Follow up with [facility] physician.  The following labs/tests are   recommended: cbc.             Unresulted Labs Ordered in the Past 30 Days of this Admission     Date and Time Order Name Status Description    12/30/2022  5:31 AM Blood Culture Peripheral Blood In process     12/30/2022  5:31 AM Blood Culture Hand, Right In process       These results will be followed up by ordering provider, nursing home physician    Discharge Disposition   Discharged to long-term care facility  Condition at discharge: Stable    Hospital Course   Richard Jordan is a 87 year old male admitted on 12/28/2022. He has a pertinent past medical history of dementia and presented with generalized weakness, fatigue, altered mental status and oxygen requirement in the setting of COVID infection since 12/23.      Acute hypoxic respiratory failure - resolved  Covid 19-infection  CAP  Patient was found to be COVID-positive 12/23 after COVID outbreak in assisted living facility.  Noted to develop mild symptoms on the 12/24.  Daughter reports he started to get worse on day of admission, with poor appetite generalized weakness and sleepiness.  Was found unresponsive and hypoxic 80s afternoon of 12/27 and was transported by EMS to the emergency department. Here, had CT PE, which was negative for clot, patchy slightly nodular infiltrates in both lungs primarily in the right upper lobe posterior segment as well as both lower lobes posteriorly. Not classic for COVID pneumonia, per radiologist. With this, as  well as procal elevation to 1.29, initiated on treatment for CAP.   Daughter declined covid treatment - remdesivir or dexamethasone.  - Received 2 dose IV ceftriaxone and 2 doses oral azithromycin while hospitalized. Sent home with a 4 day course of azithromycin to complete CAP treatment     Bradycardia  Bradycardic overnight to high 30s while here. Asymptomatic. No h/o AV block on chart review. No history of syncopal events from daughter. EKG showing sinus bradycardia. No pauses on telemetry throughout stay.  -Monitor, no need for pacemaker if not having pauses or symptoms         Consultations This Hospital Stay   CARE MANAGEMENT / SOCIAL WORK IP CONSULT  PHYSICAL THERAPY ADULT IP CONSULT    Code Status   No CPR- Do NOT Intubate       The patient was discussed with Dr. Ken Pemberton MD  Resident Service  [address] [address]  Phone: [phone]  Fax: [phone]  ______________________________________________________________________    Physical Exam   Vital Signs: Temp: 99.9  F (37.7  C) Temp src: Oral BP: 110/60 Pulse: 78   Resp: 18 SpO2: 93 % O2 Device: None (Room air)    Weight: 149 lbs 11.2 oz  Constitutional: lying in bed, pulling at sheets, restless. Redirectable  Respiratory: Normal WOB on RA. LTCA in anterior lung fields  GI: mild distention but no ttp  Neurologic: [place]; fluent speech. Oriented to self only      Primary Care Physician   Ruth Verner    Discharge Orders      General info for SNF    Length of Stay Estimate: Long Term Care  Condition at Discharge: Stable  Level of care:board and care  Rehabilitation Potential: Fair  Admission H&P remains valid and up-to-date: Yes  Recent Chemotherapy: N/A  Use Nursing Home Standing Orders: Yes     Mantoux instructions    Give two-step Mantoux (PPD) Per Facility Policy Yes     Follow Up and recommended labs and tests    Follow up with [facility] physician.  The following labs/tests  are recommended: cbc.     Reason for your hospital stay    COVID infection and bacterial pneumonia     Activity - Up with nursing assistance     Airborne Isolation     Droplet Isolation     Fall precautions     Diet    Follow this diet upon discharge: Orders Placed This Encounter      Combination Diet Regular Diet Adult       Significant Results and Procedures   Results for orders placed or performed during the hospital encounter of 12/28/22   CT Chest Pulmonary Embolism w Contrast    Narrative    EXAM: CT CHEST PULMONARY EMBOLISM W CONTRAST  LOCATION: [facility]  DATE/TIME: 12/28/2022 4:50 PM    INDICATION: covid, hypoxia, elevated d dimer  COMPARISON: None.  TECHNIQUE: CT chest pulmonary angiogram during arterial phase injection of IV contrast. Multiplanar reformats and MIP reconstructions were performed. Dose reduction techniques were used.   CONTRAST: 100ml isovue 370    FINDINGS:  ANGIOGRAM CHEST: Pulmonary arteries are normal caliber and negative for pulmonary emboli. Thoracic aorta is negative for dissection. No CT evidence of right heart strain.    LUNGS AND PLEURA: Patchy slightly nodular infiltrates in both lungs primarily in the right upper lobe posterior segment as well as both lower lobes posteriorly. Not classic for COVID pneumonia    MEDIASTINUM/AXILLAE: Normal.    CORONARY ARTERY CALCIFICATION: Severe.    UPPER ABDOMEN: Normal.    MUSCULOSKELETAL: Normal.      Impression    IMPRESSION:  1.  Negative for pulmonary emboli.    2.  Slightly nodular moderate patchy bilateral pulmonary infiltrates most likely pneumonia. Nonspecific but Not typical of COVID pneumonia.       Discharge Medications   Discharge Medication List as of 12/30/2022  1:39 PM      START taking these medications    Details   azithromycin (ZITHROMAX) 250 MG tablet Take 1 tablet (250 mg) by mouth daily, Disp-4 tablet, R-0, E-Prescribe         CONTINUE these medications which have NOT CHANGED    Details    allopurinol (ZYLOPRIM) 300 MG tablet Take 300 mg by mouth daily, Historical      aspirin 81 MG EC tablet Take 81 mg by mouth daily, Historical      atorvastatin (LIPITOR) 40 MG tablet Take 40 mg by mouth At Bedtime, Historical      clopidogrel (PLAVIX) 75 MG tablet Take 75 mg by mouth daily, Historical      lisinopril (ZESTRIL) 5 MG tablet Take 5 mg by mouth daily, Historical      multivitamin w/minerals (THERA-VIT-M) tablet Take 1 tablet by mouth daily, Historical      Vitamin D, Cholecalciferol, 25 MCG (1000 UT) TABS Take 1,000 Units by mouth daily, Historical           Allergies   No Known Allergies

## 2022-12-30 NOTE — PROGRESS NOTES
"Care Management Follow Up    Length of Stay (days): 2    Expected Discharge Date: 12/31/2022     Concerns to be Addressed:  discharge planning, care progression     Patient plan of care discussed at interdisciplinary rounds: Yes    Anticipated Discharge Disposition:  Return to Assisted Living Facility ([facility])     Anticipated Discharge Services: resumption of services from [facility]  Anticipated Discharge DME:  None    Patient/family educated on Medicare website which has current facility and service quality ratings:    Education Provided on the Discharge Plan:  Yes, daughterBatool  Patient/Family in Agreement with the Plan:  Yes    Referrals Placed by CM/SW:    Private pay costs discussed: Not applicable    Additional Information:  Chart reviewed and plan of care discussed with Dr. Pemberton.  Plan to hopefully discharge pt today pending therapy recommendations.    Received update from PT of recommendations for pt to return to [facility].    Call  Placed to Selene at [facility]  Met with pt's daughter to discuss discharge planning.  She states they only have a nurse available until 1200 today and no nurses available until Tuesday, 1/3/2023. Nurse today will not be able to process discharge orders or \"readmit\" pt.  She reviewed pt's current services.  He only receives meals delivered to his room and light housekeeping.  They are working on arranging medication management, but is not in effect yet. They do not provide any cares for pt.  She said if pt returns today or any time prior to Tuesday, the family would be responsible for his cares.  They will continue to deliver his meals if he returns.    Met with pt's daughter, Batool, to discuss discharge planning.  She voices her frustration with pt's [facility]. She states family has been providing all of pt's cares anyway so they prefer to bring him home and they will continue. She understands the [facility] will not be able to provide cares or start new services until Tuesday.  Batool " called her sister and states they are in agreement with plan for pt to return to his group home today.  The only thing she requests is a shower prior to discharge.    Information provided to pt's bedside RN.  Updates provided to Dr. Pemberton.     Daughter will transport pt to his group home.    Lucila Bennett RN

## 2022-12-31 NOTE — PLAN OF CARE
Physical Therapy Discharge Summary    Reason for therapy discharge:    Discharged to home.    Progress towards therapy goal(s). See goals on Care Plan in [facility] electronic health record for goal details.  Goals partially met.  Barriers to achieving goals:   discharge from facility.    Therapy recommendation(s):    No further therapy is recommended.

## 2023-01-01 ENCOUNTER — PATIENT OUTREACH (OUTPATIENT)
Dept: CARE COORDINATION | Facility: CLINIC | Age: 88
End: 2023-01-01

## 2023-01-01 ENCOUNTER — DOCUMENTATION ONLY (OUTPATIENT)
Dept: OTHER | Facility: CLINIC | Age: 88
End: 2023-01-01

## 2023-01-01 LAB
BACTERIA BLD CULT: NO GROWTH
BACTERIA BLD CULT: NO GROWTH

## 2023-01-02 NOTE — PROGRESS NOTES
[facility]    Background: Transitional Care Management program identified per system criteria and reviewed by [facility] team for possible outreach.    Assessment: Upon chart review, [facility] Team member will not proceed with patient outreach related to this episode of Transitional Care Management program due to reason below:    Patient has presented to Emergency Department, been readmitted to hospital, or transferred to another hospital.    Plan: Transitional Care Management episode addressed appropriately per reason noted above.      Lizz Nguyen RN  [facility] – [place], [place]    *Connected Care Resource Team does NOT follow patient ongoing. Referrals are identified based on internal discharge reports and the outreach is to ensure patient has an understanding of their discharge instructions.